# Patient Record
Sex: FEMALE | Race: WHITE
[De-identification: names, ages, dates, MRNs, and addresses within clinical notes are randomized per-mention and may not be internally consistent; named-entity substitution may affect disease eponyms.]

---

## 2021-10-19 ENCOUNTER — HOSPITAL ENCOUNTER (OUTPATIENT)
Dept: HOSPITAL 53 - M RAD | Age: 26
End: 2021-10-19
Attending: PHYSICIAN ASSISTANT
Payer: COMMERCIAL

## 2021-10-19 DIAGNOSIS — N64.52: ICD-10-CM

## 2021-10-19 DIAGNOSIS — N64.4: Primary | ICD-10-CM

## 2021-10-19 DIAGNOSIS — N64.53: ICD-10-CM

## 2021-10-19 NOTE — REP
INDICATION:

INVERTED NIPPLE RT BREAST PAIN AND NIPPLE DISCHARGE.



COMPARISON:

None



TECHNIQUE:

Real-time sonographic evaluation of right breast performed.



FINDINGS:

Right retroareolar ultrasound performed.  No cystic or solid nodule is seen.





IMPRESSION:

BIRADS/ACR category 1, negative ultrasound right breast with special attention paid to

the retroareolar region given the history of pain and nipple discharge.



RECOMMENDATION:

Recommend clinical correlation and follow-up.  If there is continued clinical concern

based on history and exam, and family history, consider consultation with breast

surgeon.  Further evaluation of nipple discharge may be made with dedicated breast MRI.





<Electronically signed by Chris Quijano > 10/19/21 [id number]

## 2022-02-02 ENCOUNTER — HOSPITAL ENCOUNTER (OUTPATIENT)
Dept: HOSPITAL 53 - M MSPAV | Age: 27
Setting detail: OBSERVATION
LOS: 1 days | Discharge: HOME | End: 2022-02-03
Attending: FAMILY MEDICINE | Admitting: FAMILY MEDICINE
Payer: COMMERCIAL

## 2022-02-02 VITALS — HEIGHT: 60 IN | BODY MASS INDEX: 22.62 KG/M2 | WEIGHT: 115.24 LBS

## 2022-02-02 VITALS — SYSTOLIC BLOOD PRESSURE: 111 MMHG | DIASTOLIC BLOOD PRESSURE: 69 MMHG

## 2022-02-02 VITALS — SYSTOLIC BLOOD PRESSURE: 108 MMHG | DIASTOLIC BLOOD PRESSURE: 69 MMHG

## 2022-02-02 VITALS — DIASTOLIC BLOOD PRESSURE: 74 MMHG | SYSTOLIC BLOOD PRESSURE: 115 MMHG

## 2022-02-02 DIAGNOSIS — R13.10: ICD-10-CM

## 2022-02-02 DIAGNOSIS — K12.2: Primary | ICD-10-CM

## 2022-02-02 DIAGNOSIS — K08.409: ICD-10-CM

## 2022-02-02 DIAGNOSIS — Z88.8: ICD-10-CM

## 2022-02-02 DIAGNOSIS — Z79.3: ICD-10-CM

## 2022-02-02 DIAGNOSIS — G93.5: ICD-10-CM

## 2022-02-02 DIAGNOSIS — Z79.2: ICD-10-CM

## 2022-02-02 DIAGNOSIS — Z79.899: ICD-10-CM

## 2022-02-02 DIAGNOSIS — E03.9: ICD-10-CM

## 2022-02-02 DIAGNOSIS — Z98.818: ICD-10-CM

## 2022-02-02 DIAGNOSIS — R25.2: ICD-10-CM

## 2022-02-02 DIAGNOSIS — R11.2: ICD-10-CM

## 2022-02-02 DIAGNOSIS — G43.909: ICD-10-CM

## 2022-02-02 DIAGNOSIS — R22.1: ICD-10-CM

## 2022-02-02 DIAGNOSIS — R22.0: ICD-10-CM

## 2022-02-02 DIAGNOSIS — R68.84: ICD-10-CM

## 2022-02-02 DIAGNOSIS — R10.13: ICD-10-CM

## 2022-02-02 DIAGNOSIS — Z88.0: ICD-10-CM

## 2022-02-02 LAB
ALBUMIN SERPL BCG-MCNC: 2.9 GM/DL (ref 3.2–5.2)
ALT SERPL W P-5'-P-CCNC: 31 U/L (ref 12–78)
BASOPHILS # BLD AUTO: 0.1 10^3/UL (ref 0–0.2)
BASOPHILS NFR BLD AUTO: 0.5 % (ref 0–1)
BILIRUB SERPL-MCNC: 0.2 MG/DL (ref 0.2–1)
BUN SERPL-MCNC: 7 MG/DL (ref 7–18)
CALCIUM SERPL-MCNC: 7.7 MG/DL (ref 8.5–10.1)
CHLORIDE SERPL-SCNC: 115 MEQ/L (ref 98–107)
CO2 SERPL-SCNC: 20 MEQ/L (ref 21–32)
CREAT SERPL-MCNC: 0.53 MG/DL (ref 0.55–1.3)
EOSINOPHIL # BLD AUTO: 0.1 10^3/UL (ref 0–0.5)
EOSINOPHIL NFR BLD AUTO: 1.1 % (ref 0–3)
GFR SERPL CREATININE-BSD FRML MDRD: > 60 ML/MIN/{1.73_M2} (ref 60–?)
GLUCOSE SERPL-MCNC: 83 MG/DL (ref 70–100)
HCT VFR BLD AUTO: 33.8 % (ref 36–47)
HGB BLD-MCNC: 11 G/DL (ref 12–15.5)
LYMPHOCYTES # BLD AUTO: 2.5 10^3/UL (ref 1.5–5)
LYMPHOCYTES NFR BLD AUTO: 24.7 % (ref 24–44)
MAGNESIUM SERPL-MCNC: 2 MG/DL (ref 1.8–2.4)
MCH RBC QN AUTO: 28.2 PG (ref 27–33)
MCHC RBC AUTO-ENTMCNC: 32.5 G/DL (ref 32–36.5)
MCV RBC AUTO: 86.7 FL (ref 80–96)
MONOCYTES # BLD AUTO: 0.4 10^3/UL (ref 0–0.8)
MONOCYTES NFR BLD AUTO: 4.3 % (ref 2–8)
NEUTROPHILS # BLD AUTO: 6.8 10^3/UL (ref 1.5–8.5)
NEUTROPHILS NFR BLD AUTO: 69.1 % (ref 36–66)
PLATELET # BLD AUTO: 189 10^3/UL (ref 150–450)
POTASSIUM SERPL-SCNC: 4 MEQ/L (ref 3.5–5.1)
PROT SERPL-MCNC: 6.7 GM/DL (ref 6.4–8.2)
RBC # BLD AUTO: 3.9 10^6/UL (ref 4–5.4)
SODIUM SERPL-SCNC: 140 MEQ/L (ref 136–145)
WBC # BLD AUTO: 9.9 10^3/UL (ref 4–10)

## 2022-02-02 PROCEDURE — 96375 TX/PRO/DX INJ NEW DRUG ADDON: CPT

## 2022-02-02 PROCEDURE — 87040 BLOOD CULTURE FOR BACTERIA: CPT

## 2022-02-02 PROCEDURE — 96365 THER/PROPH/DIAG IV INF INIT: CPT

## 2022-02-02 PROCEDURE — 85027 COMPLETE CBC AUTOMATED: CPT

## 2022-02-02 PROCEDURE — 85025 COMPLETE CBC W/AUTO DIFF WBC: CPT

## 2022-02-02 PROCEDURE — 70487 CT MAXILLOFACIAL W/DYE: CPT

## 2022-02-02 PROCEDURE — 80053 COMPREHEN METABOLIC PANEL: CPT

## 2022-02-02 PROCEDURE — 96376 TX/PRO/DX INJ SAME DRUG ADON: CPT

## 2022-02-02 PROCEDURE — 83735 ASSAY OF MAGNESIUM: CPT

## 2022-02-02 PROCEDURE — 80048 BASIC METABOLIC PNL TOTAL CA: CPT

## 2022-02-02 PROCEDURE — 36415 COLL VENOUS BLD VENIPUNCTURE: CPT

## 2022-02-02 RX ADMIN — MORPHINE SULFATE PRN MG: 4 INJECTION INTRAVENOUS at 10:37

## 2022-02-02 RX ADMIN — DEXTROSE MONOHYDRATE SCH MG: 50 INJECTION, SOLUTION INTRAVENOUS at 07:44

## 2022-02-02 RX ADMIN — SUCRALFATE SCH GM: 1 SUSPENSION ORAL at 07:44

## 2022-02-02 RX ADMIN — SERTRALINE HYDROCHLORIDE SCH MG: 100 TABLET ORAL at 15:16

## 2022-02-02 RX ADMIN — SUCRALFATE SCH GM: 1 SUSPENSION ORAL at 16:35

## 2022-02-02 RX ADMIN — CLINDAMYCIN PHOSPHATE SCH MLS/HR: 600 INJECTION, SOLUTION INTRAVENOUS at 05:26

## 2022-02-02 RX ADMIN — SODIUM CHLORIDE SCH MLS/HR: 9 INJECTION, SOLUTION INTRAVENOUS at 11:25

## 2022-02-02 RX ADMIN — MORPHINE SULFATE PRN MG: 4 INJECTION INTRAVENOUS at 15:01

## 2022-02-02 RX ADMIN — MORPHINE SULFATE PRN MG: 4 INJECTION INTRAVENOUS at 06:16

## 2022-02-02 RX ADMIN — CLINDAMYCIN PHOSPHATE SCH MLS/HR: 600 INJECTION, SOLUTION INTRAVENOUS at 14:39

## 2022-02-02 RX ADMIN — SODIUM CHLORIDE SCH MLS/HR: 9 INJECTION, SOLUTION INTRAVENOUS at 20:48

## 2022-02-02 RX ADMIN — SODIUM CHLORIDE SCH MLS/HR: 9 INJECTION, SOLUTION INTRAVENOUS at 04:08

## 2022-02-02 RX ADMIN — ONDANSETRON PRN MG: 2 INJECTION INTRAMUSCULAR; INTRAVENOUS at 06:21

## 2022-02-02 RX ADMIN — SUCRALFATE SCH GM: 1 SUSPENSION ORAL at 20:46

## 2022-02-02 RX ADMIN — SUCRALFATE SCH GM: 1 SUSPENSION ORAL at 11:58

## 2022-02-02 RX ADMIN — LEVOTHYROXINE SODIUM SCH MCG: 125 TABLET ORAL at 15:18

## 2022-02-02 RX ADMIN — MORPHINE SULFATE PRN MG: 4 INJECTION INTRAVENOUS at 23:22

## 2022-02-02 RX ADMIN — MORPHINE SULFATE PRN MG: 4 INJECTION INTRAVENOUS at 19:26

## 2022-02-02 RX ADMIN — SUCRALFATE SCH GM: 1 SUSPENSION ORAL at 07:30

## 2022-02-02 RX ADMIN — ONDANSETRON PRN MG: 2 INJECTION INTRAMUSCULAR; INTRAVENOUS at 23:21

## 2022-02-02 RX ADMIN — CLINDAMYCIN PHOSPHATE SCH MLS/HR: 600 INJECTION, SOLUTION INTRAVENOUS at 21:05

## 2022-02-03 VITALS — SYSTOLIC BLOOD PRESSURE: 115 MMHG | DIASTOLIC BLOOD PRESSURE: 65 MMHG

## 2022-02-03 LAB
BUN SERPL-MCNC: 4 MG/DL (ref 7–18)
CALCIUM SERPL-MCNC: 7.7 MG/DL (ref 8.5–10.1)
CHLORIDE SERPL-SCNC: 111 MEQ/L (ref 98–107)
CO2 SERPL-SCNC: 21 MEQ/L (ref 21–32)
CREAT SERPL-MCNC: 0.55 MG/DL (ref 0.55–1.3)
GFR SERPL CREATININE-BSD FRML MDRD: > 60 ML/MIN/{1.73_M2} (ref 60–?)
GLUCOSE SERPL-MCNC: 73 MG/DL (ref 70–100)
HCT VFR BLD AUTO: 34.2 % (ref 36–47)
HGB BLD-MCNC: 11.2 G/DL (ref 12–15.5)
MCH RBC QN AUTO: 28.8 PG (ref 27–33)
MCHC RBC AUTO-ENTMCNC: 32.7 G/DL (ref 32–36.5)
MCV RBC AUTO: 87.9 FL (ref 80–96)
PLATELET # BLD AUTO: 177 10^3/UL (ref 150–450)
POTASSIUM SERPL-SCNC: 3.5 MEQ/L (ref 3.5–5.1)
RBC # BLD AUTO: 3.89 10^6/UL (ref 4–5.4)
SODIUM SERPL-SCNC: 140 MEQ/L (ref 136–145)
WBC # BLD AUTO: 7.8 10^3/UL (ref 4–10)

## 2022-02-03 RX ADMIN — MORPHINE SULFATE PRN MG: 4 INJECTION INTRAVENOUS at 03:21

## 2022-02-03 RX ADMIN — SUCRALFATE SCH GM: 1 SUSPENSION ORAL at 08:56

## 2022-02-03 RX ADMIN — DEXTROSE MONOHYDRATE SCH MG: 50 INJECTION, SOLUTION INTRAVENOUS at 08:56

## 2022-02-03 RX ADMIN — MORPHINE SULFATE PRN MG: 4 INJECTION INTRAVENOUS at 11:46

## 2022-02-03 RX ADMIN — LEVOTHYROXINE SODIUM SCH MCG: 125 TABLET ORAL at 05:28

## 2022-02-03 RX ADMIN — CLINDAMYCIN PHOSPHATE SCH MLS/HR: 600 INJECTION, SOLUTION INTRAVENOUS at 05:28

## 2022-02-03 RX ADMIN — MORPHINE SULFATE PRN MG: 4 INJECTION INTRAVENOUS at 07:46

## 2022-02-03 RX ADMIN — ONDANSETRON PRN MG: 2 INJECTION INTRAMUSCULAR; INTRAVENOUS at 05:22

## 2022-02-03 RX ADMIN — SERTRALINE HYDROCHLORIDE SCH MG: 100 TABLET ORAL at 08:56

## 2022-02-03 RX ADMIN — SODIUM CHLORIDE SCH MLS/HR: 9 INJECTION, SOLUTION INTRAVENOUS at 05:22
